# Patient Record
Sex: FEMALE | Race: AMERICAN INDIAN OR ALASKA NATIVE
[De-identification: names, ages, dates, MRNs, and addresses within clinical notes are randomized per-mention and may not be internally consistent; named-entity substitution may affect disease eponyms.]

---

## 2018-04-10 ENCOUNTER — HOSPITAL ENCOUNTER (OUTPATIENT)
Dept: HOSPITAL 5 - VAS | Age: 63
Discharge: HOME | End: 2018-04-10
Attending: HOSPITALIST
Payer: MEDICARE

## 2018-04-10 DIAGNOSIS — M79.605: Primary | ICD-10-CM

## 2018-04-10 DIAGNOSIS — M79.89: ICD-10-CM

## 2018-04-11 NOTE — VASCULAR LAB REPORT
Left Lower Extremity Venous Duplex Study:



Reason for Exam: Pain and swelling of the left lower extremity.



Comments on the Right:

A limited duplex study was done of the proximal veins of the right 

lower extremity. All veins visualized are freely compressible without 

evidence of internal echogenicity.  Flow is spontaneous and phasic 

throughout. No evidence of acute or chronic thrombus is seen in any of 

the vessels visualized.



Comments on the Left:

All veins visualized are freely compressible without evidence of 

internal echogenicity.  Flow is spontaneous and phasic throughout. No 

evidence of acute or chronic thrombus is seen in any of the vessels 

visualized. A soft tissue change in the left knee area is consistent 

with a Baker's cyst.



Impression:

No evidence of acute or chronic deep venous thrombosis in the left 

lower extremity.

A soft tissue change in the left knee area is consistent with a Baker's 

cyst.